# Patient Record
Sex: FEMALE | Race: ASIAN | URBAN - METROPOLITAN AREA
[De-identification: names, ages, dates, MRNs, and addresses within clinical notes are randomized per-mention and may not be internally consistent; named-entity substitution may affect disease eponyms.]

---

## 2018-05-11 ENCOUNTER — OFFICE VISIT - HEALTHEAST (OUTPATIENT)
Dept: FAMILY MEDICINE | Facility: CLINIC | Age: 28
End: 2018-05-11

## 2018-05-11 DIAGNOSIS — J30.2 SEASONAL ALLERGIES: ICD-10-CM

## 2018-05-11 DIAGNOSIS — B00.9 HSV-1 (HERPES SIMPLEX VIRUS 1) INFECTION: ICD-10-CM

## 2018-05-11 RX ORDER — CHLORAL HYDRATE 500 MG
2 CAPSULE ORAL DAILY
Status: SHIPPED | COMMUNITY
Start: 2018-05-11

## 2018-05-11 ASSESSMENT — MIFFLIN-ST. JEOR: SCORE: 1179.48

## 2018-10-31 ENCOUNTER — OFFICE VISIT - HEALTHEAST (OUTPATIENT)
Dept: FAMILY MEDICINE | Facility: CLINIC | Age: 28
End: 2018-10-31

## 2018-10-31 DIAGNOSIS — Z23 NEED FOR VACCINATION: ICD-10-CM

## 2018-10-31 DIAGNOSIS — Z00.00 ROUTINE GENERAL MEDICAL EXAMINATION AT A HEALTH CARE FACILITY: ICD-10-CM

## 2018-10-31 ASSESSMENT — MIFFLIN-ST. JEOR: SCORE: 1213.16

## 2019-04-10 ENCOUNTER — COMMUNICATION - HEALTHEAST (OUTPATIENT)
Dept: FAMILY MEDICINE | Facility: CLINIC | Age: 29
End: 2019-04-10

## 2019-04-10 DIAGNOSIS — B00.9 HSV-1 (HERPES SIMPLEX VIRUS 1) INFECTION: ICD-10-CM

## 2019-04-29 ENCOUNTER — COMMUNICATION - HEALTHEAST (OUTPATIENT)
Dept: FAMILY MEDICINE | Facility: CLINIC | Age: 29
End: 2019-04-29

## 2019-04-29 DIAGNOSIS — B00.9 HSV-1 (HERPES SIMPLEX VIRUS 1) INFECTION: ICD-10-CM

## 2019-04-30 RX ORDER — VALACYCLOVIR HYDROCHLORIDE 1 G/1
TABLET, FILM COATED ORAL
Qty: 8 TABLET | Refills: 0 | Status: SHIPPED | OUTPATIENT
Start: 2019-04-30

## 2020-08-28 ENCOUNTER — OFFICE VISIT - HEALTHEAST (OUTPATIENT)
Dept: FAMILY MEDICINE | Facility: CLINIC | Age: 30
End: 2020-08-28

## 2020-08-28 DIAGNOSIS — K64.4 EXTERNAL HEMORRHOIDS: ICD-10-CM

## 2020-08-28 DIAGNOSIS — N89.8 VAGINAL ITCHING: ICD-10-CM

## 2020-08-28 RX ORDER — HYDROCORTISONE ACETATE 25 MG/1
25 SUPPOSITORY RECTAL EVERY 12 HOURS
Qty: 10 SUPPOSITORY | Refills: 1 | Status: SHIPPED | OUTPATIENT
Start: 2020-08-28

## 2021-05-27 NOTE — TELEPHONE ENCOUNTER
RN cannot approve Refill Request    RN can NOT refill this medication PCP messaged that patient is overdue for Labs and Office Visit.       Shanna Fuentes, [facility] Triage/Med Refill 4/11/2019    Requested Prescriptions   Pending Prescriptions Disp Refills     valACYclovir (VALTREX) 1000 MG tablet [Pharmacy Med Name: VALACYCLOVIR 1GM TABLETS] 8 tablet 0     Sig: TAKE 2 TABLETS(2000 MG) BY MOUTH TWICE DAILY       Antivirals Refill Protocol Failed - 4/10/2019 10:51 AM        Failed - Renal function done in last year     No results found for: CREATININE, CREATININE          Passed - Visit with PCP or prescribing provider visit in past 12 months or next 3 months     Last office visit with prescriber/PCP: 5/11/2018 Sharla Raymundo CNP OR same dept: 5/11/2018 Sharla Raymundo CNP OR same specialty: 5/11/2018 Sharla Raymundo CNP  Last physical: Visit date not found Last MTM visit: Visit date not found   Next visit within 3 mo: Visit date not found  Next physical within 3 mo: Visit date not found  Prescriber OR PCP: Sharla Raymundo CNP  Last diagnosis associated with med order: 1. HSV-1 (herpes simplex virus 1) infection  - valACYclovir (VALTREX) 1000 MG tablet [Pharmacy Med Name: VALACYCLOVIR 1GM TABLETS]; TAKE 2 TABLETS(2000 MG) BY MOUTH TWICE DAILY  Dispense: 8 tablet; Refill: 0    If protocol passes may refill for 12 months if within 3 months of last provider visit (or a total of 15 months).             Passed - Patient does not have active pregnancy episode        Passed - Patient has not had positive pregnancy test in last 280 days     No results found for: HCGQUAL, PREGTESTUR, PREGSERUM, BHCG

## 2021-05-28 NOTE — TELEPHONE ENCOUNTER
RN cannot approve Refill Request    RN can NOT refill this medication overdue for office visits and/or labs.    Warner Chance, [facility] Triage/Med Refill 4/29/2019    Requested Prescriptions   Pending Prescriptions Disp Refills     valACYclovir (VALTREX) 1000 MG tablet [Pharmacy Med Name: VALACYCLOVIR 1GM TABLETS] 8 tablet 0     Sig: TAKE 2 TABLETS(2000 MG) BY MOUTH TWICE DAILY       Antivirals Refill Protocol Failed - 4/29/2019  9:35 AM        Failed - Renal function done in last year     No results found for: CREATININE, CREATININE          Passed - Visit with PCP or prescribing provider visit in past 12 months or next 3 months     Last office visit with prescriber/PCP: 5/11/2018 Sharla Raymundo CNP OR same dept: 5/11/2018 Sharla Raymundo CNP OR same specialty: 5/11/2018 Sharla Raymundo CNP  Last physical: Visit date not found Last MTM visit: Visit date not found   Next visit within 3 mo: Visit date not found  Next physical within 3 mo: Visit date not found  Prescriber OR PCP: Sharla Raymundo CNP  Last diagnosis associated with med order: 1. HSV-1 (herpes simplex virus 1) infection  - valACYclovir (VALTREX) 1000 MG tablet [Pharmacy Med Name: VALACYCLOVIR 1GM TABLETS]; TAKE 2 TABLETS(2000 MG) BY MOUTH TWICE DAILY  Dispense: 8 tablet; Refill: 0    If protocol passes may refill for 12 months if within 3 months of last provider visit (or a total of 15 months).             Passed - Patient does not have active pregnancy episode        Passed - Patient has not had positive pregnancy test in last 280 days     No results found for: HCGQUAL, PREGTESTUR, PREGSERUM, BHCG

## 2021-06-01 VITALS — BODY MASS INDEX: 21.52 KG/M2 | HEIGHT: 61 IN | WEIGHT: 114 LBS

## 2021-06-02 VITALS — HEIGHT: 61 IN | WEIGHT: 120.38 LBS | BODY MASS INDEX: 22.73 KG/M2

## 2021-06-04 VITALS
BODY MASS INDEX: 23.58 KG/M2 | HEART RATE: 75 BPM | OXYGEN SATURATION: 98 % | DIASTOLIC BLOOD PRESSURE: 66 MMHG | SYSTOLIC BLOOD PRESSURE: 100 MMHG | TEMPERATURE: 98.6 F | RESPIRATION RATE: 16 BRPM | WEIGHT: 126 LBS

## 2021-06-11 NOTE — PROGRESS NOTES
HPI:  Sandhya Gillette is a 30 y.o. female who is seen for   Chief Complaint   Patient presents with     Hemorrhoids     bothersome for over 1 year.    Sandhya Gillette has a new swelling of a hemorrhoid that has been present for over 1 year. She has had other hemorrhoids in the past. She recently started Miralax and OTC hemorrhoid cream. She notes no pain or bleeding. She has no history of hemorrhoid surgery. She is not currently pregnant. She does not new vaginal itching in th last week and has had ongoing itching in th rectal area.  ROS: negative for fever, cough, malaise, fatigue, myalgias and as in HPI.    No results found for: HGBA1C  No results found for: GLUF, MICROALBUR, LDLCALC, CREATININE  There is no problem list on file for this patient.    Family History   Problem Relation Age of Onset     No Medical Problems Mother      No Medical Problems Father      Social History     Socioeconomic History     Marital status: Single     Spouse name: None     Number of children: None     Years of education: None     Highest education level: None   Occupational History     None   Social Needs     Financial resource strain: None     Food insecurity     Worry: None     Inability: None     Transportation needs     Medical: None     Non-medical: None   Tobacco Use     Smoking status: Never Smoker     Smokeless tobacco: Never Used   Substance and Sexual Activity     Alcohol use: Yes     Comment: rare     Drug use: No     Sexual activity: Yes     Partners: Male     Birth control/protection: None   Lifestyle     Physical activity     Days per week: None     Minutes per session: None     Stress: None   Relationships     Social connections     Talks on phone: None     Gets together: None     Attends Confucianist service: None     Active member of club or organization: None     Attends meetings of clubs or organizations: None     Relationship status: None     Intimate partner violence     Fear of current or ex partner: None     Emotionally  abused: None     Physically abused: None     Forced sexual activity: None   Other Topics Concern     None   Social History Narrative     None     No past surgical history on file.  Current Outpatient Medications on File Prior to Visit   Medication Sig Dispense Refill     LYSINE ORAL Take by mouth.       omega-3/dha/epa/fish oil (FISH OIL-OMEGA-3 FATTY ACIDS) 300-1,000 mg capsule Take 2 g by mouth daily.       valACYclovir (VALTREX) 1000 MG tablet TAKE 2 TABLETS(2000 MG) BY MOUTH TWICE DAILY 8 tablet 0     No current facility-administered medications on file prior to visit.      No Known Allergies  OB History    No obstetric history on file.       I have reviewed the patient's medical history in detail and updated the computerized patient record.  OBJECTIVE:  Wt Readings from Last 3 Encounters:   08/28/20 126 lb (57.2 kg)   10/31/18 120 lb 6 oz (54.6 kg)   05/11/18 114 lb (51.7 kg)     Temp Readings from Last 3 Encounters:   08/28/20 98.6  F (37  C) (Oral)   10/31/18 97.9  F (36.6  C) (Oral)     BP Readings from Last 3 Encounters:   08/28/20 100/66   10/31/18 102/70   05/11/18 120/68     Pulse Readings from Last 3 Encounters:   08/28/20 75   10/31/18 76   05/11/18 68     Body mass index is 23.58 kg/m .     Alert, cooperative, well-hydrated. Appears well.  Eyes: Pupils equal, round, reactive to light, Sclera white,  Lungs: Normal respirations, no increased work of respiration, equal chest rise.   Heart: Regular rate and rhythm, no murmurs, clicks,   Gallops.  Extremities: no tenderness to palpation of gastrocnemius, bilaterally.  Skin: no increased warmth, edema, or erythema of lower legs bilaterally.  : normal female, no external erythema or exudates noted.  Rectum: no external erythema or ulcerations, several scarred old hemorrhoids around the external rectum and one small additional external hemorrhoid that is pink, about .5 cm diameter.  Labs:  No visits with results within 3 Month(s) from this visit.   Latest  known visit with results is:   Physical on 10/31/2018   Component Date Value     Case Report 10/31/2018                      Value:Gynecologic Cytology Report                       Case: [id number]                                   Authorizing Provider:  Angeli Redd MD     Collected:           10/31/2018 0850              Ordering Location:     [facility]    Received:            10/31/2018 1027                                     Medicine                                                                     First Screen:          ELSIE Hong                                                                            (ASCP)                                                                       Rescreen:              ELSIE Milian                                                                                (ASCP)                                                                       Specimen:    SUREPATH PAP, SCREENING, Endocervical/cervical                                              Interpretation 10/31/2018 Negative for squamous intraepithelial lesion or malignancy      Result Flag 10/31/2018 Normal      Specimen Adequacy 10/31/2018 Satisfactory for evaluation, endocervical/transformation zone component present      HPV Reflex? 10/31/2018 Yes if ASCUS      HIGH RISK 10/31/2018                      Value:No     LMP/Menopause Date 10/31/2018                      Value:1/26/18     Abnormal Bleeding 10/31/2018                      Value:No     Pt Status 10/31/2018                      Value:na     Birth Control/Hormones 10/31/2018                      Value:None     Previous Normal/Date 10/31/2018                      Value:2-3 yrs ago     Prev Abn Date/Dx 10/31/2018                      Value:never     Cervical Appearance 10/31/2018                      Value:normal     ASSESMENT/PLAN:  1. Vaginal itching  fluconazole (DIFLUCAN) 150 MG tablet   2. External hemorrhoids   hydrocortisone 25 mg suppository    hydrocortisone (ANUSOL-HC) 2.5 % rectal cream   1. Will treat for possible vaginal yeast which may also resolve some of the chronic rectal itching.   2Anusol two times a day until swelling resolves, if bleeding or no resolution of hemorrhoid, may request surgical consult by my chart. Advised to continue daily Miralax, 6-8 glasses of water daily, diet rich in vegetables.   Daisy Valencia, MS, PA-C 09/02/20

## 2021-06-17 NOTE — PROGRESS NOTES
"  Assessment/Plan:     1. Seasonal allergies  Symptoms currently most consistent with seasonal allergies, I do not think this is anything more serious at this time. Will follow up if symptoms are not improving or getting worse. Use OTC Claritin or allegra and humidifier as needed. Continue with OTC eye drops and return to clinic if not improving in 2 weeks.   Pt is in agreement with the plan.    2. HSV-1 (herpes simplex virus 1) infection  Pt would like to try antiviral medication for her HSV-1 lesion as this is the second one in a month. Provided prescription for valacyclovir. Discussed risks and benefits of the medication as well as side effects.   - valACYclovir (VALTREX) 1000 MG tablet; Take 2 tablets (2,000 mg total) by mouth 2 (two) times a day.  Dispense: 8 tablet; Refill: 2      Subjective:      Sandhya Gillette is a 27 y.o. female who presents for itchy throat for the past week. Radiates to top of mouth, and feels cold sore blister forming on lip, left corner of mouth. Has seasonal  Allergies. Itchy throat is getting worse over last week. Has started taking allergy pills: D-Hist for past 3 days. Her lip is tingling where the cold sore is beginning. Is taking lysine for cold sore as well. Denies cough, fever, HA, ear pain.  Endorses runny nose, and some head pressure, itchy watery eyes, and has been using OTC \"pink eye drops\". Bilateral eye crusting and itching especially in inner right canthus, denies inability to open eye in the morning. Thinks eyes are better after using drops. Staying hydrated, using throat spray and drinking tea.   No ill contacts.    The following portions of the patient's history were reviewed and updated as appropriate: allergies, current medications and past medical history.    Review of Systems   Constitutional: negative  Eyes: negative  Ears, nose, mouth, throat, and face: positive for itchy throat  Respiratory: negative  Cardiovascular: negative  Hematologic/lymphatic: negative    " "  Objective:     /68 (Patient Site: Left Arm)  Pulse 68  Resp 16  Ht 5' 1\" (1.549 m)  Wt 114 lb (51.7 kg)  LMP 05/08/2018  BMI 21.54 kg/m2     General appearance: alert, appears stated age and cooperative  Head: Normocephalic, without obvious abnormality, atraumatic  Eyes: conjunctivae/corneas clear. PERRL, EOM's intact. Fundi benign.  Ears: normal TM's and external ear canals both ears  Nose: Nares slightly erythematous  Throat: abnormal findings: some post nasal discharge, early herpetic lesion   Neck: no adenopathy and supple, symmetrical, trachea midline  Lungs: clear to auscultation bilaterally  Heart: regular rate and rhythm, S1, S2 normal, no murmur, click, rub or gallop  Lymph nodes: Cervical, supraclavicular, and axillary nodes normal.      I was present for the evaluation and plan development with this patient.  Patient was seen for majority of the visit by nurse practitioner student.  Sharla JADE, CNP  "

## 2021-06-21 NOTE — PROGRESS NOTES
FEMALE PREVENTATIVE EXAM    Assessment and Plan:       1. Need for vaccination    - Influenza, Seasonal,Quad Inj, 36+ MOS    2. Routine general medical examination at a health care facility: Pap Smear obtained today.       Next follow up:  No Follow-up on file.    Immunization Review  Adult Imm Review: flu shot      I discussed the following with the patient:   Adult Healthy Living: Importance of regular exercise  Healthy nutrition  Contraception options      Subjective:   Chief Complaint/HPI: Sandhya Gillette is an 28 y.o. female here for a preventative health visit. Last pap smear 2-3 years ago in [place].  Patient believes that this was normal.  She is getting  in May.  She is not using any contraception and declines any today.  She otherwise healthy without any other questions or concerns.  She reports she had a slight episode of some vaginal itching last night but it is improved today.  She denies any recent douching or recurrent vaginal infections.         Healthy Habits  Are you taking a daily aspirin? No  Do you typically exercising at least 40 min, 3-4 times per week?  Yes  Do you usually eat at least 4 servings of fruit and vegetables a day, include whole grains and fiber and avoid regularly eating high fat foods? Yes  Have you had an eye exam in the past two years? Yes  Do you see a dentist twice per year? NO  Do you have any concerns regarding sleep? No    Safety Screen  If you own firearms, are they secured in a locked gun cabinet or with trigger locks? The patient does not own any firearms  Do you feel you are safe where you are living?: Yes (10/31/2018  8:33 AM)  Do you feel you are safe in your relationship(s)?: Yes (10/31/2018  8:33 AM)    Review of Systems:  Please see above.  The rest of the review of systems are negative for all systems.     Pap History:   unsure so completed today  Cancer Screening       Status Date      PAP SMEAR Overdue 6/10/2012           Patient Care Team:  No Primary Care  "Provider as PCP - General        History     Reviewed By Date/Time Sections Reviewed    Lisa Perry, [facility] 10/31/2018  8:33 AM Tobacco    Lisa Perry, [facility] 10/31/2018  8:32 AM Tobacco            Objective:   Vital Signs:   Visit Vitals     /70 (Patient Site: Left Arm, Patient Position: Sitting, Cuff Size: Adult Regular)     Pulse 76     Temp 97.9  F (36.6  C) (Oral)     Resp 16     Ht 5' 1.3\" (1.557 m)     Wt 120 lb 6 oz (54.6 kg)     LMP 10/26/2018 (Approximate)     Breastfeeding No     BMI 22.52 kg/m2          PHYSICAL EXAM  /70 (Patient Site: Left Arm, Patient Position: Sitting, Cuff Size: Adult Regular)  Pulse 76  Temp 97.9  F (36.6  C) (Oral)   Resp 16  Ht 5' 1.3\" (1.557 m)  Wt 120 lb 6 oz (54.6 kg)  LMP 10/26/2018 (Approximate)  Breastfeeding? No  BMI 22.52 kg/m2  Physical Examination: General appearance - alert, well appearing, and in no distress  Eyes: pupils equal and reactive, extraocular eye movements intact  Mouth: mucous membranes moist, pharynx normal without lesions  Neck: supple, no significant adenopathy  Lymphatics: no palpable lymphadenopathy  Chest: clear to auscultation, no wheezes, rales or rhonchi, symmetric air entry  Heart: normal rate, regular rhythm, normal S1, S2, no murmurs, rubs, clicks or gallops  Breast exam: Bilateral breast exam reveals no nipple discharge no discrete masses.  Otherwise normal.  Abdomen: soft, nontender, nondistended, no masses or organomegaly  Neurological: alert, oriented, normal speech, no focal findings or movement disorder noted  Extremities: No edema, no clubbing or cyanosis  Psychiatric: Normal affect. Does not appear anxious or depressed.  : Normal external genitalia.  Speculum exam reveals a normal cervix with no abnormal discharge seen from cervical os.  Normal appearing vaginal mucosa.  Pap smear obtained today.  Bimanual exam reveals a freely mobile uterus in the midline without any adnexal masses or " tenderness.            Additional Screenings Completed Today:   none

## 2021-06-27 ENCOUNTER — HEALTH MAINTENANCE LETTER (OUTPATIENT)
Age: 31
End: 2021-06-27

## 2021-10-17 ENCOUNTER — HEALTH MAINTENANCE LETTER (OUTPATIENT)
Age: 31
End: 2021-10-17

## 2021-12-07 ENCOUNTER — TELEPHONE (OUTPATIENT)
Dept: FAMILY MEDICINE | Facility: CLINIC | Age: 31
End: 2021-12-07

## 2021-12-07 NOTE — TELEPHONE ENCOUNTER
Reason for Call:  Medication or medication refill:    Do you use a [facility]?  Name of the pharmacy and phone number for the current request:      [facility] on file    Name of the medication requested:     Hydrocortisone 2.5% rectal cream    Hydrocortisone 25 mg suppository    Other request: Patient has not been seen since 8/2020 by Daisy Valencia, who is no longer with the system.  Sending to Dr Brown, who is [facility], as writer unsure where else to send and patient not willing to est care or see provider at this time.    Can we leave a detailed message on this number? YES    Phone number patient can be reached at: Home number on file [phone] (home)    Best Time: Any time    Call taken on 12/7/2021 at 10:06 AM by Irvin Parker

## 2021-12-08 NOTE — TELEPHONE ENCOUNTER
Left message for pt to call back. Pt needs an appointment prior to refills of medications. Please assist in scheduling an appt.

## 2021-12-17 NOTE — TELEPHONE ENCOUNTER
LMTCB 2nd attempt   Please assist in scheduling follow up appt if call is returned   Aren Castro CMA on 12/17/2021 at 3:52 PM

## 2022-07-24 ENCOUNTER — HEALTH MAINTENANCE LETTER (OUTPATIENT)
Age: 32
End: 2022-07-24

## 2022-10-02 ENCOUNTER — HEALTH MAINTENANCE LETTER (OUTPATIENT)
Age: 32
End: 2022-10-02

## 2023-08-12 ENCOUNTER — HEALTH MAINTENANCE LETTER (OUTPATIENT)
Age: 33
End: 2023-08-12